# Patient Record
Sex: FEMALE | Race: WHITE | Employment: UNEMPLOYED | URBAN - METROPOLITAN AREA
[De-identification: names, ages, dates, MRNs, and addresses within clinical notes are randomized per-mention and may not be internally consistent; named-entity substitution may affect disease eponyms.]

---

## 2021-07-07 PROBLEM — E06.3 HASHIMOTO'S THYROIDITIS: Status: ACTIVE | Noted: 2021-07-07

## 2021-07-07 PROBLEM — R76.8 POSITIVE ANA (ANTINUCLEAR ANTIBODY): Status: ACTIVE | Noted: 2021-07-07

## 2021-07-08 ENCOUNTER — OFFICE VISIT (OUTPATIENT)
Dept: ENDOCRINOLOGY | Age: 66
End: 2021-07-08
Payer: MEDICARE

## 2021-07-08 VITALS
HEIGHT: 66 IN | OXYGEN SATURATION: 96 % | SYSTOLIC BLOOD PRESSURE: 132 MMHG | WEIGHT: 177 LBS | BODY MASS INDEX: 28.45 KG/M2 | DIASTOLIC BLOOD PRESSURE: 82 MMHG | HEART RATE: 68 BPM

## 2021-07-08 DIAGNOSIS — E03.9 ACQUIRED HYPOTHYROIDISM: Primary | ICD-10-CM

## 2021-07-08 DIAGNOSIS — E04.9 GOITER: ICD-10-CM

## 2021-07-08 DIAGNOSIS — R76.8 POSITIVE ANA (ANTINUCLEAR ANTIBODY): ICD-10-CM

## 2021-07-08 DIAGNOSIS — E06.3 HASHIMOTO'S THYROIDITIS: ICD-10-CM

## 2021-07-08 PROCEDURE — 3017F COLORECTAL CA SCREEN DOC REV: CPT | Performed by: INTERNAL MEDICINE

## 2021-07-08 PROCEDURE — G8417 CALC BMI ABV UP PARAM F/U: HCPCS | Performed by: INTERNAL MEDICINE

## 2021-07-08 PROCEDURE — 99204 OFFICE O/P NEW MOD 45 MIN: CPT | Performed by: INTERNAL MEDICINE

## 2021-07-08 PROCEDURE — G8427 DOCREV CUR MEDS BY ELIG CLIN: HCPCS | Performed by: INTERNAL MEDICINE

## 2021-07-08 PROCEDURE — 1036F TOBACCO NON-USER: CPT | Performed by: INTERNAL MEDICINE

## 2021-07-08 PROCEDURE — 4040F PNEUMOC VAC/ADMIN/RCVD: CPT | Performed by: INTERNAL MEDICINE

## 2021-07-08 PROCEDURE — G8400 PT W/DXA NO RESULTS DOC: HCPCS | Performed by: INTERNAL MEDICINE

## 2021-07-08 PROCEDURE — 1123F ACP DISCUSS/DSCN MKR DOCD: CPT | Performed by: INTERNAL MEDICINE

## 2021-07-08 PROCEDURE — 1090F PRES/ABSN URINE INCON ASSESS: CPT | Performed by: INTERNAL MEDICINE

## 2021-07-08 RX ORDER — PREGABALIN 25 MG/1
2 CAPSULE ORAL NIGHTLY
COMMUNITY
Start: 2021-05-10

## 2021-07-08 RX ORDER — METFORMIN HYDROCHLORIDE 750 MG/1
TABLET, EXTENDED RELEASE ORAL
COMMUNITY
Start: 2021-07-06

## 2021-07-08 RX ORDER — HYDROCODONE BITARTRATE AND ACETAMINOPHEN 5; 325 MG/1; MG/1
1 TABLET ORAL EVERY 4 HOURS PRN
COMMUNITY
Start: 2021-06-30 | End: 2021-07-30

## 2021-07-08 RX ORDER — CHOLECALCIFEROL (VITAMIN D3) 125 MCG
500 CAPSULE ORAL DAILY
COMMUNITY

## 2021-07-08 RX ORDER — ESCITALOPRAM OXALATE 20 MG/1
TABLET ORAL
COMMUNITY
Start: 2021-06-01

## 2021-07-08 RX ORDER — DOXYCYCLINE HYCLATE 100 MG/1
100 CAPSULE ORAL DAILY
COMMUNITY
Start: 2021-05-05 | End: 2021-08-03

## 2021-07-08 RX ORDER — ESCITALOPRAM OXALATE 10 MG/1
TABLET ORAL
COMMUNITY
Start: 2021-06-01

## 2021-07-08 RX ORDER — ATENOLOL 25 MG/1
TABLET ORAL
COMMUNITY
Start: 2021-05-06

## 2021-07-08 RX ORDER — ATORVASTATIN CALCIUM 20 MG/1
TABLET, FILM COATED ORAL
COMMUNITY
Start: 2020-09-21

## 2021-07-08 RX ORDER — LEVOTHYROXINE SODIUM 0.05 MG/1
TABLET ORAL
COMMUNITY
Start: 2021-05-17

## 2021-07-08 RX ORDER — ASPIRIN 81 MG/1
81 TABLET, CHEWABLE ORAL DAILY
COMMUNITY

## 2021-07-08 NOTE — PROGRESS NOTES
SUBJECTIVE:  Ulysses Rojas is a 74 y.o. female who is being evaluated for autoimmune thyroiditis. 1. Hashimoto's thyroiditis  This started in 2020. Patient was diagnosed with Hashimoto's thyroiditis. The problem has been unchanged. Previous thyroid studies include: TSH and free thyroxine. Patient started medication in 1990. Currently patient is on: Levothyroxine. Misses 0 doses a month. Current complaints: fatigue, knee pain, insomnia    2. Hypothyroidism  This started in 1990. Patient was diagnosed with hypothyroidism. The problem has been unchanged. Previous thyroid studies include: TSH and free thyroxine. Patient started medication in 1990. Currently patient is on: Levothyroxine. Misses 0 doses a month. Current complaints: fatigue, knee pain, insomnia    Lost weight and gaining weight back  Fatigue is severe  Pain meds causing constipation    3. Positive CATHI (antinuclear antibody)  Sees Dr. Sinclair Diver  Positive CATHI antibodies can be found in Hashimoto's thyroiditis  Also, autoimmune conditions frequently have found together    4. Goiter  Patient had thyroid neck vomiting during her first pregnancy in 1977, which resolved  During her second pregnancy she had second neck lump  Mother had thyroid removed for Graves' disease  History of obstructive symptoms: difficulty swallowing No, changes in voice/hoarseness No.  History of radiation to patient's neck: No  Resent iodine exposure: No  Family history includes hyperthyroidism mother, sister  Family history of thyroid cancer: No    5. Vitamin D deficiency  Has fatigue      Hypoechoic lesion within the right parotid gland which appears mildly prominent compared the left. Neoplasm is a consideration and correlation with contrast-enhanced CT is recommended of the neck. Electronically Signed by:  Maia Santoro MD, 7/29/2020 9:22 AM  Narrative    NECK ULTRASOUND     HISTORY: Localized swelling or mass     FINDINGS:     Ultrasound evaluation was performed of the right parotid gland region. Plan measures 6.0 x 2.4 x 4.7 cm. A focal hypoechoic focus is seen within the gland which appears mildly lobulated measuring 1.7 x 1.5 x 1.1 cm. Left parotid was evaluated for comparison and measured 5.8 x 1.8 x 3.8 cm. Hypoechoic focus seen anterior to the left parotid gland measuring 5 mm likely represents a lymph node. Other Result Text    Desmond Minaya MD - 07/29/2020   Formatting of this note might be different from the original.   NECK ULTRASOUND     HISTORY: Localized swelling or mass     FINDINGS:     Ultrasound evaluation was performed of the right parotid gland region. Plan measures 6.0 x 2.4 x 4.7 cm. A focal hypoechoic focus is seen within the gland which appears mildly lobulated measuring 1.7 x 1.5 x 1.1 cm. Left parotid was evaluated for comparison and measured 5.8 x 1.8 x 3.8 cm. Hypoechoic focus seen anterior to the left parotid gland measuring 5 mm likely represents a lymph node. IMPRESSION:     Hypoechoic lesion within the right parotid gland which appears mildly prominent compared the left. Neoplasm is a consideration and correlation with contrast-enhanced CT is recommended of the neck. Electronically Signed by: Gopal Moulton MD, 7/29/2020 9:22 AM     Date: 07/29/20   Received From: [facility]     Encounter Summary           Electronically Signed by: Mehran Sharp MD, 7/31/2020 11:39 AM  Narrative    CT neck with contrast     INDICATION: Other diseases of salivary glands. Parotid lesion. Following intravenous administration of 65 mL Omnipaque 350 contiguous axial images were performed through the neck. Data were reformatted into multislice coronal and sagittal images. Underexposure controls were utilized during the CT examination to meet ALARA standards for radiation dose reduction. No comparison. Intracranial enhancement pattern appears within normal limits. Bilateral orbits appear normal in configuration.  Soft tissue contents of bilateral orbits appear normal in density and configuration. Paranasal sinuses are well pneumatized. Left sphenoid retention cyst or polyp measures approximately 0.9 cm long axis. Nasal septum is midline. Temporomandibular joints appear normal in configuration. Parapharyngeal fat pads appear normal in density and configuration bilaterally. The parotid glands appear normal in size. In the central aspect of the right parotid gland, and enhancing lesion is identified measuring approximately 1.6 x 1.3 cm, positioned just lateral to the transverse facial artery. Submandibular glands appear normal in size and attenuation. Cervical lymph nodes are identified bilaterally but appear normal in size and configuration. The epiglottis appears normal in size and configuration. No evidence of hypopharyngeal mass. No evidence of prevertebral swelling or thickening. The trachea is patent. The thyroid gland appears normal in size and attenuation although the right thyroid lobe appears significantly smaller than the left. Images performed through the superior aspect of the thorax appear unremarkable. The visualized aortic arch appears normal in diameter. Other Result Text    Nani Louie MD - 07/31/2020   Formatting of this note might be different from the original.   CT neck with contrast     INDICATION: Other diseases of salivary glands. Parotid lesion. Following intravenous administration of 65 mL Omnipaque 350 contiguous axial images were performed through the neck. Data were reformatted into multislice coronal and sagittal images. Underexposure controls were utilized during the CT examination to meet ALARA standards for radiation dose reduction. No comparison. Intracranial enhancement pattern appears within normal limits. Bilateral orbits appear normal in configuration. Soft tissue contents of bilateral orbits appear normal in density and configuration. Paranasal sinuses are well pneumatized. Left sphenoid retention cyst or polyp measures approximately 0.9 cm long axis. Nasal septum is midline. Temporomandibular joints appear normal in configuration. Parapharyngeal fat pads appear normal in density and configuration bilaterally. The parotid glands appear normal in size. In the central aspect of the right parotid gland, and enhancing lesion is identified measuring approximately 1.6 x 1.3 cm, positioned just lateral to the transverse facial artery. Submandibular glands appear normal in size and attenuation. Cervical lymph nodes are identified bilaterally but appear normal in size and configuration. The epiglottis appears normal in size and configuration. No evidence of hypopharyngeal mass. No evidence of prevertebral swelling or thickening. The trachea is patent. The thyroid gland appears normal in size and attenuation although the right thyroid lobe appears significantly smaller than the left. Images performed through the superior aspect of the thorax appear unremarkable. The visualized aortic arch appears normal in diameter. IMPRESSION:     Right parotid 1.6 cm enhancing lesion. Etiologies would include benign process such as pleomorphic adenoma or possibly malignancy such as mucoepidermoid carcinoma.      Electronically Signed by: Linn Durbin MD, 7/31/2020 11:39 AM     Date: 07/31/20   Received From: [facility]             Past Medical History:   Diagnosis Date    Hyperlipidemia     Hypertension     Hyperthyroidism      Patient Active Problem List    Diagnosis Date Noted    Hypothyroidism 07/08/2021    Goiter 07/08/2021    Hashimoto's thyroiditis 07/07/2021    Positive CATHI (antinuclear antibody) 07/07/2021     Past Surgical History:   Procedure Laterality Date    APPENDECTOMY      BREAST SURGERY      CARPAL TUNNEL RELEASE      HYSTERECTOMY      REVISION TOTAL KNEE ARTHROPLASTY      THYROID SURGERY  TOTAL KNEE ARTHROPLASTY       Family History   Problem Relation Age of Onset    High Blood Pressure Mother    Earna Brain Disease Mother     Heart Disease Father     Arrhythmia Father     Cervical Cancer Sister     Heart Disease Paternal Grandmother      Social History     Socioeconomic History    Marital status:      Spouse name: None    Number of children: None    Years of education: None    Highest education level: None   Occupational History    None   Tobacco Use    Smoking status: Never Smoker    Smokeless tobacco: Never Used   Substance and Sexual Activity    Alcohol use: Not Currently    Drug use: Never    Sexual activity: None   Other Topics Concern    None   Social History Narrative    None     Social Determinants of Health     Financial Resource Strain:     Difficulty of Paying Living Expenses:    Food Insecurity:     Worried About Running Out of Food in the Last Year:     Ran Out of Food in the Last Year:    Transportation Needs:     Lack of Transportation (Medical):      Lack of Transportation (Non-Medical):    Physical Activity:     Days of Exercise per Week:     Minutes of Exercise per Session:    Stress:     Feeling of Stress :    Social Connections:     Frequency of Communication with Friends and Family:     Frequency of Social Gatherings with Friends and Family:     Attends Confucianist Services:     Active Member of Clubs or Organizations:     Attends Club or Organization Meetings:     Marital Status:    Intimate Partner Violence:     Fear of Current or Ex-Partner:     Emotionally Abused:     Physically Abused:     Sexually Abused:      Current Outpatient Medications   Medication Sig Dispense Refill    doxycycline hyclate (VIBRAMYCIN) 100 MG capsule Take 100 mg by mouth daily      aspirin 81 MG chewable tablet Take 81 mg by mouth daily      levothyroxine (SYNTHROID) 50 MCG tablet TAKE ONE TABLET BY MOUTH EVERY MORNING BEFORE BREAKFAST      pregabalin (LYRICA) 25 MG capsule Take 2 capsules by mouth nightly.  HYDROcodone-acetaminophen (NORCO) 5-325 MG per tablet Take 1 tablet by mouth every 4 hours as needed.  atorvastatin (LIPITOR) 20 MG tablet atorvastatin 20 mg tablet      escitalopram (LEXAPRO) 10 MG tablet TAKE ONE TABLET BY MOUTH DAILY      escitalopram (LEXAPRO) 20 MG tablet escitalopram 20 mg tablet      atenolol (TENORMIN) 25 MG tablet atenolol 25 mg tablet      metFORMIN (GLUCOPHAGE-XR) 750 MG extended release tablet TAKE ONE TABLET BY MOUTH DAILY      Cholecalciferol (VITAMIN D3) 125 MCG (5000 UT) TABS Take by mouth daily      vitamin B-12 (CYANOCOBALAMIN) 500 MCG tablet Take 500 mcg by mouth daily       No current facility-administered medications for this visit.      Allergies   Allergen Reactions    Latex Hives and Itching    Oxycodone-Acetaminophen      hallucinations    Sulfa Antibiotics Itching     Family Status   Relation Name Status    Mother  (Not Specified)    Father  (Not Specified)    Sister  (Not Specified)    PGM  (Not Specified)       Review of Systems:  Constitutional: has fatigue, no fever, has recent weight gain, no recent weight loss, no changes in appetite  Eyes: no eye pain, has change in vision, no eye redness, no eye irritation, no double vision  Ears, nose, throat: no nasal congestion, no sore throat, no earache, has decrease in hearing, no hoarseness, no dry mouth, no sinus problems, no difficulty swallowing, no neck lumps, no dental problems, no mouth sores, no ringing in ears  Pulmonary: no shortness of breath, no wheezing, no dyspnea on exertion, no cough  Cardiovascular: no chest pain, no lower extremity edema, no orthopnea, no intermittent leg claudication, no palpitations  Gastrointestinal: no abdominal pain, no nausea, no vomiting, has diarrhea, has constipation, no dysphagia, no heartburn, no bloating  Genitourinary: no dysuria, no urinary incontinence, no urinary hesitancy, no urinary frequency, no feelings of urinary urgency, no nocturia  Musculoskeletal: no joint swelling, no joint stiffness, has joint pain, has muscle cramps, no muscle pain  Integument/Breast: has hair loss, no skin rashes, no skin lesions, no itching, has dry skin  Neurological: no numbness, no tingling, no weakness, no confusion, no headaches, no dizziness, no fainting, no tremors, no decrease in memory, no balance problems  Psychiatric: has anxiety, has depression, has insomnia  Hematologic/Lymphatic: no tendency for easy bleeding, no swollen lymph nodes, has tendency for easy bruising  Immunology: no seasonal allergies, no frequent infections, no frequent illnesses  Endocrine: no temperature intolerance    /82   Pulse 68   Ht 5' 6\" (1.676 m)   Wt 177 lb (80.3 kg)   SpO2 96%   BMI 28.57 kg/m²    Wt Readings from Last 3 Encounters:   07/08/21 177 lb (80.3 kg)     Body mass index is 28.57 kg/m².     OBJECTIVE:  Constitutional: no acute distress, well appearing and well nourished  Psychiatric: oriented to person, place and time, judgement and insight and normal, recent and remote memory and intact and mood and affect are normal  Skin: skin and subcutaneous tissue is normal without mass, normal turgor  Head and Face: examination of head and face revealed no abnormalities  Eyes: no lid or conjunctival swelling, erythema or discharge, pupils are normal, equal, round, reactive to light  Ears/Nose: external inspection of ears and nose revealed no abnormalities, hearing is grossly normal  Oropharynx/Mouth/Face: lips, tongue and gums are normal with no lesions, the voice quality was normal  Neck: neck is supple and symmetric, with midline trachea and no masses, thyroid is enlarged  Lymphatics: normal cervical lymph nodes, normal supraclavicular nodes  Pulmonary: no increased work of breathing or signs of respiratory distress, lungs are clear to auscultation  Cardiovascular: normal heart rate and rhythm, normal S1 and S2, no murmurs and pedal pulses and 2+ bilaterally, No edema  Abdomen: abdomen is soft, non-tender with no masses  Musculoskeletal: normal gait and station and exam of the digits and nails are normal  Neurological: normal coordination and normal general cortical function      Lab Review:    No results found for: WBC, HGB, HCT, MCV, PLT  No results found for: NA, K, CL, CO2, BUN, CREATININE, GLUCOSE, CALCIUM, PROT, LABALBU, BILITOT, ALKPHOS, AST, ALT, LABGLOM, GFRAA, AGRATIO, GLOB  No results found for: TSHFT4, TSH, FT3  No results found for: LABA1C  No results found for: EAG  No results found for: CHOL  No results found for: TRIG  No results found for: HDL  No results found for: LDLCHOLESTEROL, LDLCALC  No results found for: LABVLDL, VLDL  No results found for: CHOLHDLRATIO  No results found for: LABMICR, [id number]  No results found for: TAEP00     ASSESSMENT/PLAN:  1. Hashimoto's thyroiditis  Follow thyroid function  Counseled patient about Hashimoto's thyroiditis and hypothyroidism  - T3, Free; Future  - T4, Free; Future  - TSH without Reflex; Future    2. Positive CATHI (antinuclear antibody)  Follow with Dr. Livia Rosales, rheumatology    3. Acquired hypothyroidism  Continue levothyroxine 0.05 mg daily  Adjust dose based on results  - T3, Free; Future  - T4, Free; Future  - TSH without Reflex; Future    4. Goiter  - US HEAD NECK SOFT TISSUE THYROID; Future    5. Vitamin D deficiency  Continue vitamin D 5000 international units daily  25 hydroxy vitamin D      Reviewed and/or ordered clinical lab results Yes  Reviewed and/or ordered radiology tests Yes   Reviewed and/or ordered other diagnostic tests No  Discussed test results with performing physician No  Independently reviewed image, tracing, or specimen No  Made a decision to obtain old records No  Reviewed and summarized old records Yes   CT 7/31/2020  The trachea is patent.  The thyroid gland appears normal in size and attenuation although the right thyroid lobe appears significantly smaller than the left. TPO, Tg ab positive  25OHvitamin D 24.1-53.8  Obtained history from other than patient No    Ever Mack was counseled regarding symptoms of Hashimoto's thyroiditis, hypothyroidism diagnosis, course and complications of disease if inadequately treated, side effects of medications, diagnosis, treatment options, and prognosis, risks, benefits, complications, and alternatives of treatment, labs, imaging and other studies and treatment targets and goals, association between Hashimoto's thyroiditis and positive CATHI, Hashimoto's thyroiditis is most frequent cause of hypothyroidism. She understands instructions and counseling. Total time I spent for this encounter 45 minutes    Return in about 2 months (around 9/8/2021) for thyroid problems.     Electronically signed by Robert Murphy MD on 7/8/2021 at 10:02 AM

## 2021-08-03 ENCOUNTER — TELEPHONE (OUTPATIENT)
Dept: ENDOCRINOLOGY | Age: 66
End: 2021-08-03

## 2021-08-03 DIAGNOSIS — E04.1 THYROID NODULE: Primary | ICD-10-CM

## 2021-08-03 NOTE — TELEPHONE ENCOUNTER
Pt calling and wants to know that since she's not heard anything back about her labs and US results that everything must be ok? She had them done @ [facility] on 7-13-21 for both [address].  The results are in [facility]# [phone]

## 2021-08-03 NOTE — TELEPHONE ENCOUNTER
I reviewed the results with patient. Thyroid function test results are very good. TSH 1.3. Patient has left side 2.1 cm nodule. Referred for FNA. Patient will call with a fax number for [facility], please fax the order to the radiology department when she provides us with the number.